# Patient Record
Sex: FEMALE | Race: WHITE | NOT HISPANIC OR LATINO | Employment: OTHER | URBAN - METROPOLITAN AREA
[De-identification: names, ages, dates, MRNs, and addresses within clinical notes are randomized per-mention and may not be internally consistent; named-entity substitution may affect disease eponyms.]

---

## 2017-09-14 ENCOUNTER — OFFICE VISIT (OUTPATIENT)
Dept: CARDIOLOGY | Facility: MEDICAL CENTER | Age: 67
End: 2017-09-14
Payer: MEDICARE

## 2017-09-14 VITALS
BODY MASS INDEX: 30.92 KG/M2 | SYSTOLIC BLOOD PRESSURE: 118 MMHG | HEIGHT: 68 IN | HEART RATE: 72 BPM | DIASTOLIC BLOOD PRESSURE: 72 MMHG | OXYGEN SATURATION: 95 % | WEIGHT: 204 LBS

## 2017-09-14 DIAGNOSIS — E78.5 DYSLIPIDEMIA: ICD-10-CM

## 2017-09-14 DIAGNOSIS — R00.2 PALPITATIONS: ICD-10-CM

## 2017-09-14 DIAGNOSIS — I10 ESSENTIAL HYPERTENSION: ICD-10-CM

## 2017-09-14 DIAGNOSIS — I34.0 MITRAL VALVE INSUFFICIENCY, UNSPECIFIED ETIOLOGY: ICD-10-CM

## 2017-09-14 PROCEDURE — 99214 OFFICE O/P EST MOD 30 MIN: CPT | Performed by: INTERNAL MEDICINE

## 2017-09-14 RX ORDER — FLUOXETINE HYDROCHLORIDE 20 MG/1
20 CAPSULE ORAL DAILY
COMMUNITY

## 2017-09-14 ASSESSMENT — ENCOUNTER SYMPTOMS
BRUISES/BLEEDS EASILY: 0
DIZZINESS: 0
DEPRESSION: 0
LOSS OF CONSCIOUSNESS: 0
ORTHOPNEA: 0
FALLS: 0
PND: 0
SHORTNESS OF BREATH: 0
PALPITATIONS: 1
ABDOMINAL PAIN: 0

## 2017-09-14 NOTE — LETTER
[facility]   [address], [address]  [place] [address]  Phone: [phone]  Fax: [phone]              Marine Wall  1950    Encounter Date: 9/14/2017    Renetta Lopez M.D.          PROGRESS NOTE:  Subjective:   Marine Wall is a 67 y.o. femaleWith past medical history significant for hypertension and mitral regurgitation who is presenting to clinic for follow-up. Patient's main concern is almost daily palpitations. She describes them as either sustained fast heartbeat or flip-flop in her chest. No associated dizziness. No history of syncope. No chest discomfort or dyspnea. She drinks 2 cups of caffeinated tea every morning.    She walks 3 times a day with her dog for about half a mile in 10-20 minutes without any symptoms.    Her blood pressure is usually 110/70's.      Past Medical History:   Diagnosis Date   • Hypertension    • Palpitation      Past Surgical History:   Procedure Laterality Date   • AIYANA BY LAPAROSCOPY      3 yr ago     Family History   Problem Relation Age of Onset   • Heart Disease Father    • Heart Attack Father      MI at age 52     History   Smoking Status   • Never Smoker   Smokeless Tobacco   • Never Used     No alcohol or recreational drug use.    No Known Allergies  Outpatient Encounter Prescriptions as of 9/14/2017   Medication Sig Dispense Refill   • fluoxetine (PROZAC) 20 MG Cap Take 20 mg by mouth every day.     • Calcium Carbonate (CALCIUM 600 PO) Take  by mouth.     • lisinopril (PRINIVIL) 20 MG TABS Take 20 mg by mouth every day.     • vitamin D, Ergocalciferol, (DRISDOL) 68479 UNITS CAPS capsule Take  by mouth every 7 days.       No facility-administered encounter medications on file as of 9/14/2017.      Review of Systems   Constitutional: Negative for malaise/fatigue.   HENT: Negative.    Respiratory: Negative for shortness of breath.    Cardiovascular: Positive for palpitations. Negative for chest pain,  "orthopnea, leg swelling and PND.   Gastrointestinal: Negative for abdominal pain.   Musculoskeletal: Negative for falls.   Skin: Negative.    Neurological: Negative for dizziness and loss of consciousness.   Endo/Heme/Allergies: Does not bruise/bleed easily.   Psychiatric/Behavioral: Negative for depression.   All other systems reviewed and are negative.       Objective:   /72   Pulse 72   Ht 1.715 m (5' 7.5\")   Wt 92.5 kg (204 lb)   SpO2 95%   BMI 31.48 kg/m²      Physical Exam   Constitutional: She is oriented to person, place, and time. No distress.   HENT:   Head: Normocephalic and atraumatic.   Eyes: Conjunctivae are normal.   Neck: Normal range of motion. Neck supple. No JVD present.   Cardiovascular: Normal rate and regular rhythm.  Exam reveals no gallop and no friction rub.    Murmur heard.   Systolic murmur is present with a grade of 2/6   Pulmonary/Chest: Effort normal and breath sounds normal. No respiratory distress. She has no wheezes. She has no rales.   Abdominal: Soft. There is no tenderness.   Musculoskeletal: She exhibits no edema.   Neurological: She is alert and oriented to person, place, and time.   Skin: Skin is warm and dry. She is not diaphoretic.   Psychiatric: She has a normal mood and affect.   Nursing note and vitals reviewed.    Echocardiogram in 2014 showed normal LV function with mild mitral regurgitation.      Assessment:     1. Essential hypertension     2. Dyslipidemia     3. Mitral valve insufficiency, unspecified etiology  ECHOCARDIOGRAM COMP W/O CONT   4. Palpitations  HOLTER MONITOR STUDY       Medical Decision Making:  Today's Assessment / Status / Plan:     Patient appears to have pretty frequent palpitations. She has a history of short SVT in the past. For now I have advised her to limit her caffeinated beverage intake to one drink per day or less. I will obtain a Holter monitor for further evaluation. Hold off on addition of AV hoa blocking agents at this " time.    Blood pressure is at goal. Continue lisinopril at current dose.    Mitral regurgitation appreciated on exam. I will refer her for an echocardiogram to evaluate for severity of the mitral regurgitation.    Return to clinic in 1 month or earlier if needed.    Thank you for allowing me to participate in the care of this patient. Please do not hesitate to contact me with any questions.    Renetta Lopez MD  Cardiologist  [facility]      PLEASE NOTE: This dictation was created using voice recognition software.         JUAN ANTONIO Narvaez  [address] [place] [id number]  VIA Facsimile: [phone]

## 2017-09-14 NOTE — PROGRESS NOTES
Subjective:   Marine Wall is a 67 y.o. femaleWith past medical history significant for hypertension and mitral regurgitation who is presenting to clinic for follow-up. Patient's main concern is almost daily palpitations. She describes them as either sustained fast heartbeat or flip-flop in her chest. No associated dizziness. No history of syncope. No chest discomfort or dyspnea. She drinks 2 cups of caffeinated tea every morning.    She walks 3 times a day with her dog for about half a mile in 10-20 minutes without any symptoms.    Her blood pressure is usually 110/70's.      Past Medical History:   Diagnosis Date   • Hypertension    • Palpitation      Past Surgical History:   Procedure Laterality Date   • AIYANA BY LAPAROSCOPY      3 yr ago     Family History   Problem Relation Age of Onset   • Heart Disease Father    • Heart Attack Father      MI at age 52     History   Smoking Status   • Never Smoker   Smokeless Tobacco   • Never Used     No alcohol or recreational drug use.    No Known Allergies  Outpatient Encounter Prescriptions as of 9/14/2017   Medication Sig Dispense Refill   • fluoxetine (PROZAC) 20 MG Cap Take 20 mg by mouth every day.     • Calcium Carbonate (CALCIUM 600 PO) Take  by mouth.     • lisinopril (PRINIVIL) 20 MG TABS Take 20 mg by mouth every day.     • vitamin D, Ergocalciferol, (DRISDOL) 58135 UNITS CAPS capsule Take  by mouth every 7 days.       No facility-administered encounter medications on file as of 9/14/2017.      Review of Systems   Constitutional: Negative for malaise/fatigue.   HENT: Negative.    Respiratory: Negative for shortness of breath.    Cardiovascular: Positive for palpitations. Negative for chest pain, orthopnea, leg swelling and PND.   Gastrointestinal: Negative for abdominal pain.   Musculoskeletal: Negative for falls.   Skin: Negative.    Neurological: Negative for dizziness and loss of consciousness.   Endo/Heme/Allergies: Does not bruise/bleed easily.  "  Psychiatric/Behavioral: Negative for depression.   All other systems reviewed and are negative.       Objective:   /72   Pulse 72   Ht 1.715 m (5' 7.5\")   Wt 92.5 kg (204 lb)   SpO2 95%   BMI 31.48 kg/m²     Physical Exam   Constitutional: She is oriented to person, place, and time. No distress.   HENT:   Head: Normocephalic and atraumatic.   Eyes: Conjunctivae are normal.   Neck: Normal range of motion. Neck supple. No JVD present.   Cardiovascular: Normal rate and regular rhythm.  Exam reveals no gallop and no friction rub.    Murmur heard.   Systolic murmur is present with a grade of 2/6   Pulmonary/Chest: Effort normal and breath sounds normal. No respiratory distress. She has no wheezes. She has no rales.   Abdominal: Soft. There is no tenderness.   Musculoskeletal: She exhibits no edema.   Neurological: She is alert and oriented to person, place, and time.   Skin: Skin is warm and dry. She is not diaphoretic.   Psychiatric: She has a normal mood and affect.   Nursing note and vitals reviewed.    Echocardiogram in 2014 showed normal LV function with mild mitral regurgitation.      Assessment:     1. Essential hypertension     2. Dyslipidemia     3. Mitral valve insufficiency, unspecified etiology  ECHOCARDIOGRAM COMP W/O CONT   4. Palpitations  HOLTER MONITOR STUDY       Medical Decision Making:  Today's Assessment / Status / Plan:     Patient appears to have pretty frequent palpitations. She has a history of short SVT in the past. For now I have advised her to limit her caffeinated beverage intake to one drink per day or less. I will obtain a Holter monitor for further evaluation. Hold off on addition of AV hoa blocking agents at this time.    Blood pressure is at goal. Continue lisinopril at current dose.    Mitral regurgitation appreciated on exam. I will refer her for an echocardiogram to evaluate for severity of the mitral regurgitation.    Return to clinic in 1 month or earlier if " needed.    Thank you for allowing me to participate in the care of this patient. Please do not hesitate to contact me with any questions.    Renetta Lopez MD  Cardiologist  [facility]      PLEASE NOTE: This dictation was created using voice recognition software.

## 2017-09-14 NOTE — PATIENT INSTRUCTIONS
Please decrease your caffeine intake to either one 12oz cup per day or none.   You can replace with decaf or herbal tea.

## 2017-09-29 ENCOUNTER — HOSPITAL ENCOUNTER (OUTPATIENT)
Dept: CARDIOLOGY | Facility: MEDICAL CENTER | Age: 67
End: 2017-09-29
Attending: INTERNAL MEDICINE
Payer: MEDICARE

## 2017-09-29 DIAGNOSIS — I34.0 MITRAL VALVE INSUFFICIENCY, UNSPECIFIED ETIOLOGY: ICD-10-CM

## 2017-09-29 PROCEDURE — 93306 TTE W/DOPPLER COMPLETE: CPT | Mod: 26 | Performed by: INTERNAL MEDICINE

## 2017-10-02 LAB
LV EJECT FRACT  99904: 65
LV EJECT FRACT MOD 2C 99903: 68.57
LV EJECT FRACT MOD 4C 99902: 62.92
LV EJECT FRACT MOD BP 99901: 65.56

## 2017-10-03 ENCOUNTER — TELEPHONE (OUTPATIENT)
Dept: CARDIOLOGY | Facility: MEDICAL CENTER | Age: 67
End: 2017-10-03

## 2017-10-03 NOTE — TELEPHONE ENCOUNTER
Attempted to call pt to discuss echo. Unable to leave message. Will call back later.    1610: S/w pt about Echo results. She denies any questions at this time and is appreciative of call. Pt will FU as planned.    ----- Message from Renetta Lopez M.D. sent at 10/3/2017  4:01 PM PDT -----  Reviewed echocardiogram. Looks good. Only mild MR.  No change in management at this time.   Thank you   AA

## 2017-10-12 ENCOUNTER — NON-PROVIDER VISIT (OUTPATIENT)
Dept: CARDIOLOGY | Facility: MEDICAL CENTER | Age: 67
End: 2017-10-12
Payer: MEDICARE

## 2017-10-12 DIAGNOSIS — I49.3 PVC (PREMATURE VENTRICULAR CONTRACTION): ICD-10-CM

## 2017-10-12 DIAGNOSIS — I49.1 PREMATURE ATRIAL CONTRACTION: ICD-10-CM

## 2017-10-12 DIAGNOSIS — R00.2 PALPITATIONS: ICD-10-CM

## 2017-10-12 DIAGNOSIS — R00.0 SINUS TACHYCARDIA: ICD-10-CM

## 2017-10-17 LAB — EKG IMPRESSION: NORMAL

## 2017-10-17 PROCEDURE — 93224 XTRNL ECG REC UP TO 48 HRS: CPT | Performed by: INTERNAL MEDICINE

## 2017-10-19 ENCOUNTER — OFFICE VISIT (OUTPATIENT)
Dept: CARDIOLOGY | Facility: MEDICAL CENTER | Age: 67
End: 2017-10-19
Payer: MEDICARE

## 2017-10-19 VITALS
OXYGEN SATURATION: 96 % | HEART RATE: 78 BPM | SYSTOLIC BLOOD PRESSURE: 118 MMHG | HEIGHT: 68 IN | DIASTOLIC BLOOD PRESSURE: 68 MMHG

## 2017-10-19 DIAGNOSIS — I47.10 SVT (SUPRAVENTRICULAR TACHYCARDIA): ICD-10-CM

## 2017-10-19 DIAGNOSIS — I10 ESSENTIAL HYPERTENSION: ICD-10-CM

## 2017-10-19 PROCEDURE — 99214 OFFICE O/P EST MOD 30 MIN: CPT | Performed by: INTERNAL MEDICINE

## 2017-10-19 RX ORDER — METOPROLOL SUCCINATE 25 MG/1
12.5 TABLET, EXTENDED RELEASE ORAL DAILY
Qty: 30 TAB | Refills: 11 | Status: SHIPPED
Start: 2017-10-19 | End: 2020-01-10

## 2017-10-19 ASSESSMENT — ENCOUNTER SYMPTOMS
LOSS OF CONSCIOUSNESS: 0
BRUISES/BLEEDS EASILY: 0
SHORTNESS OF BREATH: 0
ABDOMINAL PAIN: 0
DEPRESSION: 0
DIZZINESS: 0
FALLS: 0
ORTHOPNEA: 0
PND: 0
PALPITATIONS: 1

## 2017-10-19 NOTE — PROGRESS NOTES
Subjective:   Marine Wall is a 67 y.o. Female with past medical history significant for hypertension and mitral regurgitation who is presenting to clinic for follow-up.     Patient continues to report intermittent palpitations. Some days she'll have multiple episodes, other days should she has none. She has cut down her caffeinated beverage intake to one drink per day. Has not noted any changes since cutting down on her caffeine intake.    She continues to exercise on a regular basis. Goes on regular walks with her dog or uses a treadmill. Denies any symptoms with exercise.     Blood pressure is usually well controlled, similar to today.    Past Medical History:   Diagnosis Date   • Hypertension    • Palpitation      Past Surgical History:   Procedure Laterality Date   • AIYANA BY LAPAROSCOPY      3 yr ago     Family History   Problem Relation Age of Onset   • Heart Disease Father    • Heart Attack Father      MI at age 52     History   Smoking Status   • Never Smoker   Smokeless Tobacco   • Never Used     No alcohol or recreational drug use.    No Known Allergies  Outpatient Encounter Prescriptions as of 10/19/2017   Medication Sig Dispense Refill   • metoprolol SR (TOPROL XL) 25 MG TABLET SR 24 HR Take 0.5 Tabs by mouth every day. 30 Tab 11   • fluoxetine (PROZAC) 20 MG Cap Take 20 mg by mouth every day.     • Calcium Carbonate (CALCIUM 600 PO) Take  by mouth.     • lisinopril (PRINIVIL) 20 MG TABS Take 20 mg by mouth every day.     • [DISCONTINUED] vitamin D, Ergocalciferol, (DRISDOL) 83033 UNITS CAPS capsule Take  by mouth every 7 days.       No facility-administered encounter medications on file as of 10/19/2017.      Review of Systems   Constitutional: Negative for malaise/fatigue.   HENT: Negative.    Respiratory: Negative for shortness of breath.    Cardiovascular: Positive for palpitations. Negative for chest pain, orthopnea, leg swelling and PND.   Gastrointestinal: Negative for abdominal pain.  "  Musculoskeletal: Negative for falls.   Skin: Negative.    Neurological: Negative for dizziness and loss of consciousness.   Endo/Heme/Allergies: Does not bruise/bleed easily.   Psychiatric/Behavioral: Negative for depression.   All other systems reviewed and are negative.       Objective:   /68   Pulse 78   Ht 1.715 m (5' 7.5\")   SpO2 96%     Physical Exam   Constitutional: She is oriented to person, place, and time. No distress.   HENT:   Head: Normocephalic and atraumatic.   Eyes: Conjunctivae are normal.   Neck: Normal range of motion. Neck supple. No JVD present.   Cardiovascular: Normal rate and regular rhythm.  Exam reveals no gallop and no friction rub.    No murmur heard.  Pulmonary/Chest: Effort normal and breath sounds normal. No respiratory distress. She has no wheezes. She has no rales.   Abdominal: Soft. There is no tenderness.   Musculoskeletal: She exhibits no edema.   Neurological: She is alert and oriented to person, place, and time.   Skin: Skin is warm and dry. She is not diaphoretic.   Psychiatric: She has a normal mood and affect.   Nursing note and vitals reviewed.    Echocardiogram in 2014 showed normal LV function with mild mitral regurgitation.    Echocardiogram from September 2017 was reviewed  CONCLUSIONS  Prior echo 11/20/2014. No significant change noted compared to prior   study.   Mild concentric left ventricular hypertrophy.  Normal left ventricular systolic function. Left ventricular ejection   fraction is visually estimated to be 65%.  Normal diastolic function.  Normal inferior vena cava size and inspiratory collapse.  Aortic sclerosis without stenosis.  Estimated right ventricular systolic pressure  is 25 mmHg.    Holter monitor from October 2017 was reviewed  Interpretive Statements   *  Monitoring started at 3:05 PM and continued for 48 hours. **Only 33 hours   55 minutes 34 seconds of readable data   available.**     Cardiac rhythm is Sinus. The average heart rate " was 77 BPM.   The minimum heart rate was 55 BPM, occurring at   3:55:08 AM.        The maximum heart rate was 122 BPM, occurring at 4:37:22   PM. The longest R-R interval was 1.4 seconds   occurring at         7:20:50 AM D1.   *  Ventricular ectopic activity consisted of 120 multifocal single PVCs, six   single endiastolic ventricular beats.   *  The patient's rhythm included 50 minutes 48 seconds of sinus tachycardia   with maximum heart rate of 122 BPM.   *  Supraventricular ectopic activity consisted of one atrial couplet, 17   single PACs.   *  Diary entries - symptoms listed in diary were noted in sinus rhythm with   an isolated ventricular ectopic at symptoms   occurrence.   Heart rate during symptoms 62 to 97 BPM.   Summary:   Normal sinus rhythm   No sustained arrhythmias noted   One diary entry that correlated with a PVC. overall rare PVCs.     Assessment:     1. Essential hypertension     2. SVT (supraventricular tachycardia) (CMS-[facility])         Medical Decision Making:  Today's Assessment / Status / Plan:     Patient continues to be bothered by her palpitations. Holter monitor only showed rare PVCs but some did correlate with symptoms. I do not think she needs any AV hoa blocking agents for this, however patient is extremely bothered by her symptoms. She would like to try some medication. She reports previously being on metoprolol 25 mg twice a day which made her hypotensive. She would like to try a lower dose. I will start her on metoprolol succinate 12.5 mg once a day. Patient should continue to avoid caffeine as much as possible.    Her blood pressure is at goal. Continue lisinopril at current dose.    Recent echocardiogram only showed mild mitral regurgitation. No changes in management for now.    Return to clinic in 2 months or earlier if needed.    Thank you for allowing me to participate in the care of this patient. Please do not hesitate to contact me with any questions.    Renetta Lopez  MD  Cardiologist  [facility]      PLEASE NOTE: This dictation was created using voice recognition software.

## 2017-11-01 ENCOUNTER — APPOINTMENT (RX ONLY)
Dept: URBAN - NONMETROPOLITAN AREA CLINIC 1 | Facility: CLINIC | Age: 67
Setting detail: DERMATOLOGY
End: 2017-11-01

## 2017-11-01 DIAGNOSIS — D485 NEOPLASM OF UNCERTAIN BEHAVIOR OF SKIN: ICD-10-CM

## 2017-11-01 DIAGNOSIS — H01.13 ECZEMATOUS DERMATITIS OF EYELID: ICD-10-CM

## 2017-11-01 DIAGNOSIS — M71 OTHER BURSOPATHIES: ICD-10-CM

## 2017-11-01 PROBLEM — M71.341 OTHER BURSAL CYST, RIGHT HAND: Status: ACTIVE | Noted: 2017-11-01

## 2017-11-01 PROBLEM — H01.131 ECZEMATOUS DERMATITIS OF RIGHT UPPER EYELID: Status: ACTIVE | Noted: 2017-11-01

## 2017-11-01 PROBLEM — F41.9 ANXIETY DISORDER, UNSPECIFIED: Status: ACTIVE | Noted: 2017-11-01

## 2017-11-01 PROBLEM — J30.1 ALLERGIC RHINITIS DUE TO POLLEN: Status: ACTIVE | Noted: 2017-11-01

## 2017-11-01 PROBLEM — M12.9 ARTHROPATHY, UNSPECIFIED: Status: ACTIVE | Noted: 2017-11-01

## 2017-11-01 PROBLEM — F32.9 MAJOR DEPRESSIVE DISORDER, SINGLE EPISODE, UNSPECIFIED: Status: ACTIVE | Noted: 2017-11-01

## 2017-11-01 PROBLEM — I10 ESSENTIAL (PRIMARY) HYPERTENSION: Status: ACTIVE | Noted: 2017-11-01

## 2017-11-01 PROBLEM — D48.5 NEOPLASM OF UNCERTAIN BEHAVIOR OF SKIN: Status: ACTIVE | Noted: 2017-11-01

## 2017-11-01 PROCEDURE — ? INCISION AND DRAINAGE

## 2017-11-01 PROCEDURE — 99202 OFFICE O/P NEW SF 15 MIN: CPT | Mod: 25

## 2017-11-01 PROCEDURE — 10060 I&D ABSCESS SIMPLE/SINGLE: CPT

## 2017-11-01 PROCEDURE — ? BIOPSY BY SHAVE METHOD

## 2017-11-01 PROCEDURE — 11100: CPT

## 2017-11-01 PROCEDURE — ? PRESCRIPTION

## 2017-11-01 PROCEDURE — ? COUNSELING

## 2017-11-01 RX ORDER — HYDROCORTISONE 2.5 %
OINTMENT (GRAM) TOPICAL
Qty: 1 | Refills: 0 | Status: ERX | COMMUNITY
Start: 2017-11-01

## 2017-11-01 RX ADMIN — Medication: at 21:46

## 2017-11-01 ASSESSMENT — LOCATION DETAILED DESCRIPTION DERM
LOCATION DETAILED: RIGHT LATERAL SUPERIOR EYELID
LOCATION DETAILED: RIGHT DISTAL DORSAL INDEX FINGER
LOCATION DETAILED: RIGHT PROXIMAL PALMAR SMALL FINGER

## 2017-11-01 ASSESSMENT — LOCATION SIMPLE DESCRIPTION DERM
LOCATION SIMPLE: RIGHT SUPERIOR EYELID
LOCATION SIMPLE: RIGHT SMALL FINGER
LOCATION SIMPLE: RIGHT INDEX FINGER

## 2017-11-01 ASSESSMENT — LOCATION ZONE DERM
LOCATION ZONE: FINGER
LOCATION ZONE: EYELID

## 2017-11-01 NOTE — PROCEDURE: INCISION AND DRAINAGE
Lesion Type: Cyst
Suture Text: The incision was partially closed with
Epidermal Closure: simple interrupted
Drainage Type?: clear
Preparation Text: The area was prepped with Hibiclens and alcohol.
Curette: No
Render Postcare In Note?: Yes
Size Of Lesion In Cm (Optional But May Be Required For Some Insurances): 0.6
Drainage Amount?: moderate
Epidermal Sutures: 4-0 Ethilon
Method: 11 blade
Consent was obtained and risks were reviewed including but not limited to delayed wound healing, infection, need for multiple I and D's, and pain.
Anesthesia Volume In Cc: 0.5
Curette Text (Optional): After the contents were expressed a curette was used to partially remove the cyst wall.
Dressing: dry sterile dressing
Post-Care Instructions: I reviewed with the patient in detail post-care instructions. Patient should keep wound covered and call the office should any redness, pain, swelling or worsening occur.
Detail Level: Detailed

## 2017-11-01 NOTE — PROCEDURE: BIOPSY BY SHAVE METHOD
Hemostasis: Electrodesiccation
Curettage Text: The wound bed was treated with curettage after the biopsy was done.
Cryotherapy Text: The wound bed was treated with cryotherapy after the biopsy was performed.
Consent: Written consent was obtained and risks were reviewed including but not limited to scarring, infection, bleeding, scabbing, incomplete removal, nerve damage and allergy to anesthesia.
Destruction After The Procedure: No
Biopsy Method: Aparna burdick
Anesthesia Type: 1% lidocaine with epinephrine and a 1:10 solution of 8.4% sodium bicarbonate
Additional Anesthesia Volume In Cc (Will Not Render If 0): 0
Notification Instructions: Patient will be notified of biopsy results. However, patient instructed to call the office if not contacted within 2 weeks.
Billing Type: Third-Party Bill
Render Post-Care Instructions In Note?: yes
Lab: [id number]
Dressing: no dressing applied
Electrodesiccation Text: The wound bed was treated with electrodesiccation after the biopsy was performed.
Wound Care: Polysporin ointment
X Size Of Lesion In Cm: 0.2
Electrodesiccation And Curettage Text: The wound bed was treated with electrodesiccation and curettage after the biopsy was performed.
Detail Level: Detailed
Size Of Lesion In Cm: 0.3
Anesthesia Volume In Cc: 1
Silver Nitrate Text: The wound bed was treated with silver nitrate after the biopsy was performed.
Type Of Destruction Used: Curettage
Post-Care Instructions: I reviewed with the patient in detail post-care instructions. Patient is to keep the biopsy site dry overnight, and then apply Polysporin twice daily until healed. Patient may apply hydrogen peroxide soaks to remove any crusting.
Lab Facility: [id number]
Biopsy Type: H and E

## 2017-11-14 ENCOUNTER — TELEPHONE (OUTPATIENT)
Dept: CARDIOLOGY | Facility: MEDICAL CENTER | Age: 67
End: 2017-11-14

## 2019-09-24 ENCOUNTER — TELEPHONE (OUTPATIENT)
Dept: CARDIOLOGY | Facility: MEDICAL CENTER | Age: 69
End: 2019-09-24

## 2019-09-24 NOTE — TELEPHONE ENCOUNTER
Called patient in regards to any records or and lab results we need to obtain before upcoming appointment w/. Patient states she [facility] in [place]. Results received and sent to scanning.

## 2020-01-07 ENCOUNTER — TELEPHONE (OUTPATIENT)
Dept: CARDIOLOGY | Facility: MEDICAL CENTER | Age: 70
End: 2020-01-07

## 2020-01-07 NOTE — TELEPHONE ENCOUNTER
"Called pt back. She states for the past couple days, she's been experiencing palpitations \"now and again\". She has history of these however they are becoming more noticeable. Pt checks her BP daily, 148/77 this morning. She reports she has not been taking her metoprolol 12.5mg daily because she states \"before, it will bring my pulse as low as 40.\" Advised that metoprolol not only treats high BP but also regulates HR to prevent palpitations. Verbalized understanding. Pt states she drinks plenty of water and she has avoided caffeine products. She previously had a ZioPatch monitor which she was allergic to. Symptoms reported are fatigue/tired in the morning after she wakes up. Pt denies CP, light-headedness, and SOB, currently able to continue her day-to-day activities.    In the mean time, pt advised to continue hydration and avoiding caffeine products. Should she feel symptoms, she will report to ER right away, verbalized understanding.    To Dr. Lopez - pt has had several cancelled appointments since October 2019. She is scheduled for [facility] on 3/9/20. Please advise. Thank you!  "

## 2020-01-08 NOTE — TELEPHONE ENCOUNTER
AA      Patient is following up with another call, she said she was expecting a call back yesterday. She can be reached at [phone].

## 2020-01-10 ENCOUNTER — HOSPITAL ENCOUNTER (OUTPATIENT)
Dept: LAB | Facility: MEDICAL CENTER | Age: 70
End: 2020-01-10
Attending: INTERNAL MEDICINE
Payer: MEDICARE

## 2020-01-10 ENCOUNTER — OFFICE VISIT (OUTPATIENT)
Dept: CARDIOLOGY | Facility: MEDICAL CENTER | Age: 70
End: 2020-01-10
Payer: MEDICARE

## 2020-01-10 VITALS
HEART RATE: 82 BPM | BODY MASS INDEX: 28.49 KG/M2 | DIASTOLIC BLOOD PRESSURE: 60 MMHG | HEIGHT: 68 IN | OXYGEN SATURATION: 97 % | SYSTOLIC BLOOD PRESSURE: 122 MMHG | WEIGHT: 188 LBS

## 2020-01-10 DIAGNOSIS — I47.10 SVT (SUPRAVENTRICULAR TACHYCARDIA) (HCC): ICD-10-CM

## 2020-01-10 DIAGNOSIS — I10 ESSENTIAL HYPERTENSION: ICD-10-CM

## 2020-01-10 DIAGNOSIS — I15.8 OTHER SECONDARY HYPERTENSION: ICD-10-CM

## 2020-01-10 DIAGNOSIS — I34.0 MITRAL VALVE INSUFFICIENCY, UNSPECIFIED ETIOLOGY: ICD-10-CM

## 2020-01-10 DIAGNOSIS — Z79.899 ENCOUNTER FOR LONG-TERM (CURRENT) USE OF HIGH-RISK MEDICATION: ICD-10-CM

## 2020-01-10 DIAGNOSIS — R00.2 PALPITATIONS: ICD-10-CM

## 2020-01-10 LAB
ANION GAP SERPL CALC-SCNC: 9 MMOL/L (ref 0–11.9)
BUN SERPL-MCNC: 19 MG/DL (ref 8–22)
CALCIUM SERPL-MCNC: 9.7 MG/DL (ref 8.5–10.5)
CHLORIDE SERPL-SCNC: 103 MMOL/L (ref 96–112)
CO2 SERPL-SCNC: 27 MMOL/L (ref 20–33)
CREAT SERPL-MCNC: 0.77 MG/DL (ref 0.5–1.4)
EKG IMPRESSION: NORMAL
ERYTHROCYTE [DISTWIDTH] IN BLOOD BY AUTOMATED COUNT: 41.6 FL (ref 35.9–50)
GLUCOSE SERPL-MCNC: 84 MG/DL (ref 65–99)
HCT VFR BLD AUTO: 48.6 % (ref 37–47)
HGB BLD-MCNC: 16 G/DL (ref 12–16)
MCH RBC QN AUTO: 30 PG (ref 27–33)
MCHC RBC AUTO-ENTMCNC: 32.9 G/DL (ref 33.6–35)
MCV RBC AUTO: 91 FL (ref 81.4–97.8)
PLATELET # BLD AUTO: 279 K/UL (ref 164–446)
PMV BLD AUTO: 12.2 FL (ref 9–12.9)
POTASSIUM SERPL-SCNC: 3.9 MMOL/L (ref 3.6–5.5)
RBC # BLD AUTO: 5.34 M/UL (ref 4.2–5.4)
SODIUM SERPL-SCNC: 139 MMOL/L (ref 135–145)
T4 FREE SERPL-MCNC: 0.99 NG/DL (ref 0.53–1.43)
TSH SERPL DL<=0.005 MIU/L-ACNC: 0.96 UIU/ML (ref 0.38–5.33)
WBC # BLD AUTO: 12.2 K/UL (ref 4.8–10.8)

## 2020-01-10 PROCEDURE — 93000 ELECTROCARDIOGRAM COMPLETE: CPT | Performed by: INTERNAL MEDICINE

## 2020-01-10 PROCEDURE — 84439 ASSAY OF FREE THYROXINE: CPT | Mod: GA

## 2020-01-10 PROCEDURE — 85027 COMPLETE CBC AUTOMATED: CPT

## 2020-01-10 PROCEDURE — 84443 ASSAY THYROID STIM HORMONE: CPT | Mod: GA

## 2020-01-10 PROCEDURE — 99215 OFFICE O/P EST HI 40 MIN: CPT | Performed by: INTERNAL MEDICINE

## 2020-01-10 PROCEDURE — 36415 COLL VENOUS BLD VENIPUNCTURE: CPT | Mod: GA

## 2020-01-10 PROCEDURE — 80048 BASIC METABOLIC PNL TOTAL CA: CPT

## 2020-01-10 ASSESSMENT — ENCOUNTER SYMPTOMS
ORTHOPNEA: 0
DIZZINESS: 0
PND: 0
DIAPHORESIS: 0
DEPRESSION: 0
FALLS: 0
LOSS OF CONSCIOUSNESS: 0
ABDOMINAL PAIN: 0
PALPITATIONS: 1
SHORTNESS OF BREATH: 0

## 2020-01-10 NOTE — PROGRESS NOTES
Chief Complaint   Patient presents with   • Hypertension       Subjective:   Marine Wall is a 69 y.o. female who presents today to follow-up on palpitations.    Patient was last seen in 2017 and reports a longstanding history of palpitations.    She reports being in her usual state of health till about 10 days ago when she started noticing increasing frequency of palpitations which she describes as skipped beats and flip-flopping of her heart.  She reports having symptoms daily without any clear triggers which usually resolve within few minutes spontaneously.    She reports today that she has been on Prozac 20 mg for about 20+ years.  She recently saw her sister who suffers from tardive dyskinesia and decided to wean herself off of the Prozac over a month or so.  Shortly after being off of the Prozac she started being really depressed and decided to go back on Prozac and started taking 40 mg once daily about a week ago.  Shortly after starting the higher dose of Prozac her symptoms worsened as noted above.    Her blood pressures are mostly in the 110s to 120s systolic.  She reports having one systolic reading in the 140s which concerned her as well.    Patient has questions about her last echocardiogram.    Past Medical History:   Diagnosis Date   • Hypertension    • Palpitation      Past Surgical History:   Procedure Laterality Date   • AIYANA BY LAPAROSCOPY      3 yr ago     Family History   Problem Relation Age of Onset   • Heart Disease Father    • Heart Attack Father         MI at age 52     Social History     Socioeconomic History   • Marital status: Legally      Spouse name: Not on file   • Number of children: Not on file   • Years of education: Not on file   • Highest education level: Not on file   Occupational History   • Not on file   Social Needs   • Financial resource strain: Not on file   • Food insecurity:     Worry: Not on file     Inability: Not on file   • Transportation needs:      Medical: Not on file     Non-medical: Not on file   Tobacco Use   • Smoking status: Never Smoker   • Smokeless tobacco: Never Used   Substance and Sexual Activity   • Alcohol use: Not Currently   • Drug use: No   • Sexual activity: Not on file   Lifestyle   • Physical activity:     Days per week: Not on file     Minutes per session: Not on file   • Stress: Not on file   Relationships   • Social connections:     Talks on phone: Not on file     Gets together: Not on file     Attends [employer] service: Not on file     Active member of club or organization: Not on file     Attends meetings of clubs or organizations: Not on file     Relationship status: Not on file   • Intimate partner violence:     Fear of current or ex partner: Not on file     Emotionally abused: Not on file     Physically abused: Not on file     Forced sexual activity: Not on file   Other Topics Concern   • Not on file   Social History Narrative   • Not on file     No Known Allergies  Outpatient Encounter Medications as of 1/10/2020   Medication Sig Dispense Refill   • fluoxetine (PROZAC) 20 MG Cap Take 20 mg by mouth every day.     • Calcium Carbonate (CALCIUM 600 PO) Take  by mouth.     • lisinopril (PRINIVIL) 20 MG TABS Take 20 mg by mouth every day.     • [DISCONTINUED] metoprolol SR (TOPROL XL) 25 MG TABLET SR 24 HR Take 0.5 Tabs by mouth every day. (Patient not taking: Reported on 1/10/2020) 30 Tab 11     No facility-administered encounter medications on file as of 1/10/2020.      Review of Systems   Constitutional: Negative for diaphoresis and malaise/fatigue.   Respiratory: Negative for shortness of breath.    Cardiovascular: Positive for palpitations. Negative for chest pain, orthopnea, leg swelling and PND.   Gastrointestinal: Negative for abdominal pain.   Musculoskeletal: Negative for falls.   Neurological: Negative for dizziness and loss of consciousness.   Psychiatric/Behavioral: Negative for depression.   All other systems reviewed and  "are negative.       Objective:   /60 (BP Location: Left arm, Patient Position: Sitting, BP Cuff Size: Adult)   Pulse 82   Ht 1.727 m (5' 8\")   Wt 85.3 kg (188 lb)   SpO2 97%   BMI 28.59 kg/m²     Physical Exam   Constitutional: She is oriented to person, place, and time. She appears well-developed and well-nourished. No distress.   HENT:   Head: Normocephalic and atraumatic.   Eyes: Conjunctivae are normal. No scleral icterus.   Neck: Normal range of motion. Neck supple.   Cardiovascular: Normal rate, regular rhythm and normal heart sounds. Exam reveals no gallop and no friction rub.   No murmur heard.  Pulmonary/Chest: Effort normal and breath sounds normal. No respiratory distress. She has no wheezes. She has no rales.   Abdominal: Soft. She exhibits no distension. There is no tenderness.   Musculoskeletal:         General: No edema.   Neurological: She is alert and oriented to person, place, and time.   Skin: Skin is warm and dry. She is not diaphoretic.   Psychiatric: She has a normal mood and affect. Her behavior is normal.   Nursing note and vitals reviewed.    Labs performed in August 2019 were reviewed and showed normal TSH.  Normal creatinine.  Normal hemoglobin  , HDL 59    EKG performed today was personally reviewed and per my interpretation shows sinus rhythm with occasional PACs.  ST depressions in the anterolateral leads.    Assessment:     1. Palpitations  EKG    CBC WITHOUT DIFFERENTIAL    Basic Metabolic Panel    TSH    FREE THYROXINE    Holter Monitor / Event Recorder   2. Essential hypertension     3. Mitral valve insufficiency, unspecified etiology     4. Encounter for long-term (current) use of high-risk medication         Medical Decision Making:  Today's Assessment / Status / Plan:     Palpitations: Based on her history appear to be related to frequent ectopy.  She will be referred for a 2-day Bio-Tel monitor with 3 channels to evaluate for burden of ectopy versus other " arrhythmias.  No new medications for now.  Basic labs have been ordered today.  Patient has been strongly advised to discuss with her PCP regarding her Prozac dosing as I suspect the changes in that medication are the likely cause of her worsening symptoms.  She may consider reducing her Prozac dosing to 20 mg which was her chronic dose before she discontinued it.    Hypertension: Blood pressure is at goal today.  No medication changes for now.  Continue lisinopril at current dose.    Mitral regurgitation: Mild in severity noted on echocardiogram in 2017.  Patient wonders if she needs a repeat echocardiogram.  She has been reassured that she did not have any severe valvular pathology on her last echocardiogram and does not have any significant murmurs.  No indication for an echocardiogram at this time.  We can reevaluate this based on her Holter results.    Return to clinic in 1 month or earlier if needed.    Thank you for allowing me to participate in the care of this patient. Please do not hesitate to contact me with any questions.    Renetta Lopez MD, [facility]  Cardiologist  [facility]    PLEASE NOTE: This dictation was created using voice recognition software.

## 2020-01-10 NOTE — LETTER
[facility]   [address]   [address]  [place] [address]  Phone: [phone]  Fax: [phone]              Marine Wall  1950    Encounter Date: 1/10/2020    Renetta Lopez M.D.          PROGRESS NOTE:  Chief Complaint   Patient presents with   • Hypertension       Subjective:   Marine Wall is a 69 y.o. female who presents today to follow-up on palpitations.    Patient was last seen in 2017 and reports a longstanding history of palpitations.    She reports being in her usual state of health till about 10 days ago when she started noticing increasing frequency of palpitations which she describes as skipped beats and flip-flopping of her heart.  She reports having symptoms daily without any clear triggers which usually resolve within few minutes spontaneously.    She reports today that she has been on Prozac 20 mg for about 20+ years.  She recently saw her sister who suffers from tardive dyskinesia and decided to wean herself off of the Prozac over a month or so.  Shortly after being off of the Prozac she started being really depressed and decided to go back on Prozac and started taking 40 mg once daily about a week ago.  Shortly after starting the higher dose of Prozac her symptoms worsened as noted above.    Her blood pressures are mostly in the 110s to 120s systolic.  She reports having one systolic reading in the 140s which concerned her as well.    Patient has questions about her last echocardiogram.    Past Medical History:   Diagnosis Date   • Hypertension    • Palpitation      Past Surgical History:   Procedure Laterality Date   • AIYANA BY LAPAROSCOPY      3 yr ago     Family History   Problem Relation Age of Onset   • Heart Disease Father    • Heart Attack Father         MI at age 52     Social History     Socioeconomic History   • Marital status: Legally      Spouse name: Not on file   • Number of children: Not on file      • Years of education: Not on file   • Highest education level: Not on file   Occupational History   • Not on file   Social Needs   • Financial resource strain: Not on file   • Food insecurity:     Worry: Not on file     Inability: Not on file   • Transportation needs:     Medical: Not on file     Non-medical: Not on file   Tobacco Use   • Smoking status: Never Smoker   • Smokeless tobacco: Never Used   Substance and Sexual Activity   • Alcohol use: Not Currently   • Drug use: No   • Sexual activity: Not on file   Lifestyle   • Physical activity:     Days per week: Not on file     Minutes per session: Not on file   • Stress: Not on file   Relationships   • Social connections:     Talks on phone: Not on file     Gets together: Not on file     Attends [employer] service: Not on file     Active member of club or organization: Not on file     Attends meetings of clubs or organizations: Not on file     Relationship status: Not on file   • Intimate partner violence:     Fear of current or ex partner: Not on file     Emotionally abused: Not on file     Physically abused: Not on file     Forced sexual activity: Not on file   Other Topics Concern   • Not on file   Social History Narrative   • Not on file     No Known Allergies  Outpatient Encounter Medications as of 1/10/2020   Medication Sig Dispense Refill   • fluoxetine (PROZAC) 20 MG Cap Take 20 mg by mouth every day.     • Calcium Carbonate (CALCIUM 600 PO) Take  by mouth.     • lisinopril (PRINIVIL) 20 MG TABS Take 20 mg by mouth every day.     • [DISCONTINUED] metoprolol SR (TOPROL XL) 25 MG TABLET SR 24 HR Take 0.5 Tabs by mouth every day. (Patient not taking: Reported on 1/10/2020) 30 Tab 11     No facility-administered encounter medications on file as of 1/10/2020.      Review of Systems   Constitutional: Negative for diaphoresis and malaise/fatigue.   Respiratory: Negative for shortness of breath.    Cardiovascular: Positive for palpitations. Negative for chest  "pain, orthopnea, leg swelling and PND.   Gastrointestinal: Negative for abdominal pain.   Musculoskeletal: Negative for falls.   Neurological: Negative for dizziness and loss of consciousness.   Psychiatric/Behavioral: Negative for depression.   All other systems reviewed and are negative.       Objective:   /60 (BP Location: Left arm, Patient Position: Sitting, BP Cuff Size: Adult)   Pulse 82   Ht 1.727 m (5' 8\")   Wt 85.3 kg (188 lb)   SpO2 97%   BMI 28.59 kg/m²      Physical Exam   Constitutional: She is oriented to person, place, and time. She appears well-developed and well-nourished. No distress.   HENT:   Head: Normocephalic and atraumatic.   Eyes: Conjunctivae are normal. No scleral icterus.   Neck: Normal range of motion. Neck supple.   Cardiovascular: Normal rate, regular rhythm and normal heart sounds. Exam reveals no gallop and no friction rub.   No murmur heard.  Pulmonary/Chest: Effort normal and breath sounds normal. No respiratory distress. She has no wheezes. She has no rales.   Abdominal: Soft. She exhibits no distension. There is no tenderness.   Musculoskeletal:         General: No edema.   Neurological: She is alert and oriented to person, place, and time.   Skin: Skin is warm and dry. She is not diaphoretic.   Psychiatric: She has a normal mood and affect. Her behavior is normal.   Nursing note and vitals reviewed.    Labs performed in August 2019 were reviewed and showed normal TSH.  Normal creatinine.  Normal hemoglobin  , HDL 59    EKG performed today was personally reviewed and per my interpretation shows sinus rhythm with occasional PACs.  ST depressions in the anterolateral leads.    Assessment:     1. Palpitations  EKG    CBC WITHOUT DIFFERENTIAL    Basic Metabolic Panel    TSH    FREE THYROXINE    Holter Monitor / Event Recorder   2. Essential hypertension     3. Mitral valve insufficiency, unspecified etiology     4. Encounter for long-term (current) use of high-risk " medication         Medical Decision Making:  Today's Assessment / Status / Plan:     Palpitations: Based on her history appear to be related to frequent ectopy.  She will be referred for a 2-day Bio-Tel monitor with 3 channels to evaluate for burden of ectopy versus other arrhythmias.  No new medications for now.  Basic labs have been ordered today.  Patient has been strongly advised to discuss with her PCP regarding her Prozac dosing as I suspect the changes in that medication are the likely cause of her worsening symptoms.  She may consider reducing her Prozac dosing to 20 mg which was her chronic dose before she discontinued it.    Hypertension: Blood pressure is at goal today.  No medication changes for now.  Continue lisinopril at current dose.    Mitral regurgitation: Mild in severity noted on echocardiogram in 2017.  Patient wonders if she needs a repeat echocardiogram.  She has been reassured that she did not have any severe valvular pathology on her last echocardiogram and does not have any significant murmurs.  No indication for an echocardiogram at this time.  We can reevaluate this based on her Holter results.    Return to clinic in 1 month or earlier if needed.    Thank you for allowing me to participate in the care of this patient. Please do not hesitate to contact me with any questions.    Renetta Lopez MD, [facility]  Cardiologist  [facility]    PLEASE NOTE: This dictation was created using voice recognition software.         Abbey Koehler, A.PJENISE.  [address] [place] [id number]  VIA Facsimile: [phone]

## 2020-01-14 ENCOUNTER — TELEPHONE (OUTPATIENT)
Dept: CARDIOLOGY | Facility: MEDICAL CENTER | Age: 70
End: 2020-01-14

## 2020-01-14 NOTE — TELEPHONE ENCOUNTER
"Called pt back. Pt had concerns about her elevated WBC and low Hgb. Pt reassured Hgb 16.0 which is within range. WBC 12.2 however pt does not report s/s of infection. Pt has c/o fatigue and palpitations which she states has remained \"about the same\" since her last OV. Advised if pt experiencing worsening symptoms, she should give us a call. Confirmed her one month FV on 2/11/20. She has not scheduled her BioTel monitor. Given phone number to schedulers per request. Call transferred to schedulers as well. Pt appreciative of call back.  "

## 2020-01-14 NOTE — TELEPHONE ENCOUNTER
MARIELA/juana    Pt calling to discuss recent lab results, has concerns about anemia. Please call Marine [phone]

## 2020-01-16 NOTE — TELEPHONE ENCOUNTER
Pt phone call transferred from operators. Pt was lying down resting and suddenly felt palpitations and dizziness that lasted 5-7 seconds. Pt feels fine currently however this episode scared her. She is scheduled for Holter monitor on 1/30 followed by FV with AA on 2/11/20. Pt took her BP this morning and it was normal, she didn't check her BP and HR during the episode.    Advised pt should this occur again and/or if it becomes more frequent, she should report to ER immediately. Pt verbalized understanding.    To Dr. Lopez - pt is scheduled for 2-day BioTel, 3 channels on 1/30/20. Please advise if other recommendations at this time. Thank you!

## 2020-01-17 NOTE — TELEPHONE ENCOUNTER
"Called pt back. She saw her PCP today and went to her pharmacy. There was a pending refill for metoprolol which pt is not currently prescribed. Medication was discontinued on 1/10/20 pt reported not taking. Advised it has been discontinued, verbalized understanding.    Pt has not had any palpitations since yesterday afternoon and asymptomatic. \"My heart feels calm today.\" Appreciative of call.  "

## 2020-02-18 ENCOUNTER — NON-PROVIDER VISIT (OUTPATIENT)
Dept: CARDIOLOGY | Facility: MEDICAL CENTER | Age: 70
End: 2020-02-18
Payer: MEDICARE

## 2020-02-18 DIAGNOSIS — I49.1 PAC (PREMATURE ATRIAL CONTRACTION): ICD-10-CM

## 2020-02-18 DIAGNOSIS — R00.2 PALPITATIONS: ICD-10-CM

## 2020-02-18 DIAGNOSIS — I47.10 SVT (SUPRAVENTRICULAR TACHYCARDIA) (HCC): ICD-10-CM

## 2020-02-18 PROCEDURE — 93224 XTRNL ECG REC UP TO 48 HRS: CPT | Performed by: INTERNAL MEDICINE

## 2020-02-25 LAB — EKG IMPRESSION: NORMAL

## 2024-07-08 ENCOUNTER — DOCUMENTATION (OUTPATIENT)
Dept: HOSPITALIST | Facility: MEDICAL CENTER | Age: 74
End: 2024-07-08
Payer: MEDICARE

## 2024-07-08 ENCOUNTER — HOSPITAL ENCOUNTER (OUTPATIENT)
Facility: MEDICAL CENTER | Age: 74
End: 2024-07-09
Attending: STUDENT IN AN ORGANIZED HEALTH CARE EDUCATION/TRAINING PROGRAM | Admitting: STUDENT IN AN ORGANIZED HEALTH CARE EDUCATION/TRAINING PROGRAM
Payer: MEDICARE

## 2024-07-08 DIAGNOSIS — I48.91 NEW ONSET ATRIAL FIBRILLATION (HCC): ICD-10-CM

## 2024-07-08 DIAGNOSIS — I10 ESSENTIAL HYPERTENSION: ICD-10-CM

## 2024-07-08 PROCEDURE — 99222 1ST HOSP IP/OBS MODERATE 55: CPT | Mod: GC | Performed by: STUDENT IN AN ORGANIZED HEALTH CARE EDUCATION/TRAINING PROGRAM

## 2024-07-08 PROCEDURE — G0378 HOSPITAL OBSERVATION PER HR: HCPCS

## 2024-07-09 ENCOUNTER — APPOINTMENT (OUTPATIENT)
Dept: CARDIOLOGY | Facility: MEDICAL CENTER | Age: 74
End: 2024-07-09
Attending: HOSPITALIST
Payer: MEDICARE

## 2024-07-09 VITALS
SYSTOLIC BLOOD PRESSURE: 136 MMHG | WEIGHT: 186.29 LBS | HEART RATE: 81 BPM | TEMPERATURE: 97.7 F | HEIGHT: 68 IN | DIASTOLIC BLOOD PRESSURE: 62 MMHG | BODY MASS INDEX: 28.23 KG/M2 | RESPIRATION RATE: 15 BRPM | OXYGEN SATURATION: 94 %

## 2024-07-09 PROBLEM — R07.9 CHEST PAIN: Status: RESOLVED | Noted: 2024-07-09 | Resolved: 2024-07-09

## 2024-07-09 PROBLEM — R07.9 CHEST PAIN: Status: ACTIVE | Noted: 2024-07-09

## 2024-07-09 LAB
ALBUMIN SERPL BCP-MCNC: 3.4 G/DL (ref 3.2–4.9)
ALBUMIN/GLOB SERPL: 1.5 G/DL
ALP SERPL-CCNC: 57 U/L (ref 30–99)
ALT SERPL-CCNC: 15 U/L (ref 2–50)
ANION GAP SERPL CALC-SCNC: 10 MMOL/L (ref 7–16)
AST SERPL-CCNC: 13 U/L (ref 12–45)
BASOPHILS # BLD AUTO: 0.5 % (ref 0–1.8)
BASOPHILS # BLD: 0.05 K/UL (ref 0–0.12)
BILIRUB SERPL-MCNC: 0.3 MG/DL (ref 0.1–1.5)
BUN SERPL-MCNC: 10 MG/DL (ref 8–22)
CALCIUM ALBUM COR SERPL-MCNC: 9.4 MG/DL (ref 8.5–10.5)
CALCIUM SERPL-MCNC: 8.9 MG/DL (ref 8.5–10.5)
CHLORIDE SERPL-SCNC: 110 MMOL/L (ref 96–112)
CO2 SERPL-SCNC: 23 MMOL/L (ref 20–33)
CREAT SERPL-MCNC: 0.77 MG/DL (ref 0.5–1.4)
EOSINOPHIL # BLD AUTO: 0.08 K/UL (ref 0–0.51)
EOSINOPHIL NFR BLD: 0.9 % (ref 0–6.9)
ERYTHROCYTE [DISTWIDTH] IN BLOOD BY AUTOMATED COUNT: 41.7 FL (ref 35.9–50)
GFR SERPLBLD CREATININE-BSD FMLA CKD-EPI: 80 ML/MIN/1.73 M 2
GLOBULIN SER CALC-MCNC: 2.3 G/DL (ref 1.9–3.5)
GLUCOSE SERPL-MCNC: 109 MG/DL (ref 65–99)
HCT VFR BLD AUTO: 43.7 % (ref 37–47)
HGB BLD-MCNC: 14.1 G/DL (ref 12–16)
IMM GRANULOCYTES # BLD AUTO: 0.03 K/UL (ref 0–0.11)
IMM GRANULOCYTES NFR BLD AUTO: 0.3 % (ref 0–0.9)
LV EJECT FRACT  99904: 65
LV EJECT FRACT MOD 2C 99903: 71.16
LV EJECT FRACT MOD 4C 99902: 68.46
LV EJECT FRACT MOD BP 99901: 69.27
LYMPHOCYTES # BLD AUTO: 2.05 K/UL (ref 1–4.8)
LYMPHOCYTES NFR BLD: 22.2 % (ref 22–41)
MAGNESIUM SERPL-MCNC: 2 MG/DL (ref 1.5–2.5)
MCH RBC QN AUTO: 29.1 PG (ref 27–33)
MCHC RBC AUTO-ENTMCNC: 32.3 G/DL (ref 32.2–35.5)
MCV RBC AUTO: 90.3 FL (ref 81.4–97.8)
MONOCYTES # BLD AUTO: 0.84 K/UL (ref 0–0.85)
MONOCYTES NFR BLD AUTO: 9.1 % (ref 0–13.4)
NEUTROPHILS # BLD AUTO: 6.18 K/UL (ref 1.82–7.42)
NEUTROPHILS NFR BLD: 67 % (ref 44–72)
NRBC # BLD AUTO: 0 K/UL
NRBC BLD-RTO: 0 /100 WBC (ref 0–0.2)
PLATELET # BLD AUTO: 255 K/UL (ref 164–446)
PMV BLD AUTO: 11.9 FL (ref 9–12.9)
POTASSIUM SERPL-SCNC: 3.5 MMOL/L (ref 3.6–5.5)
PROT SERPL-MCNC: 5.7 G/DL (ref 6–8.2)
RBC # BLD AUTO: 4.84 M/UL (ref 4.2–5.4)
SODIUM SERPL-SCNC: 143 MMOL/L (ref 135–145)
TSH SERPL DL<=0.005 MIU/L-ACNC: 1.11 UIU/ML (ref 0.38–5.33)
WBC # BLD AUTO: 9.2 K/UL (ref 4.8–10.8)

## 2024-07-09 PROCEDURE — 85025 COMPLETE CBC W/AUTO DIFF WBC: CPT

## 2024-07-09 PROCEDURE — 83735 ASSAY OF MAGNESIUM: CPT

## 2024-07-09 PROCEDURE — 84443 ASSAY THYROID STIM HORMONE: CPT

## 2024-07-09 PROCEDURE — 99239 HOSP IP/OBS DSCHRG MGMT >30: CPT | Performed by: HOSPITALIST

## 2024-07-09 PROCEDURE — 80053 COMPREHEN METABOLIC PANEL: CPT

## 2024-07-09 PROCEDURE — 93306 TTE W/DOPPLER COMPLETE: CPT | Mod: 26 | Performed by: INTERNAL MEDICINE

## 2024-07-09 PROCEDURE — 700102 HCHG RX REV CODE 250 W/ 637 OVERRIDE(OP)

## 2024-07-09 PROCEDURE — 93306 TTE W/DOPPLER COMPLETE: CPT

## 2024-07-09 PROCEDURE — A9270 NON-COVERED ITEM OR SERVICE: HCPCS

## 2024-07-09 PROCEDURE — G0378 HOSPITAL OBSERVATION PER HR: HCPCS

## 2024-07-09 RX ORDER — METOPROLOL SUCCINATE 25 MG/1
25 TABLET, EXTENDED RELEASE ORAL DAILY
Qty: 30 TABLET | Refills: 3 | Status: SHIPPED | OUTPATIENT
Start: 2024-07-09

## 2024-07-09 RX ORDER — ENOXAPARIN SODIUM 100 MG/ML
40 INJECTION SUBCUTANEOUS DAILY
Status: DISCONTINUED | OUTPATIENT
Start: 2024-07-10 | End: 2024-07-09

## 2024-07-09 RX ORDER — AMLODIPINE BESYLATE 5 MG/1
5 TABLET ORAL DAILY
Qty: 30 TABLET | Refills: 3 | Status: SHIPPED | OUTPATIENT
Start: 2024-07-10

## 2024-07-09 RX ORDER — FLUOXETINE HYDROCHLORIDE 20 MG/1
40 CAPSULE ORAL DAILY
Status: DISCONTINUED | OUTPATIENT
Start: 2024-07-09 | End: 2024-07-09

## 2024-07-09 RX ORDER — FLUOXETINE HYDROCHLORIDE 20 MG/1
20 CAPSULE ORAL DAILY
Status: DISCONTINUED | OUTPATIENT
Start: 2024-07-09 | End: 2024-07-09 | Stop reason: HOSPADM

## 2024-07-09 RX ORDER — AMLODIPINE BESYLATE 5 MG/1
5 TABLET ORAL
Status: DISCONTINUED | OUTPATIENT
Start: 2024-07-09 | End: 2024-07-09 | Stop reason: HOSPADM

## 2024-07-09 RX ADMIN — METOPROLOL TARTRATE 25 MG: 25 TABLET, FILM COATED ORAL at 05:14

## 2024-07-09 RX ADMIN — APIXABAN 5 MG: 5 TABLET, FILM COATED ORAL at 17:10

## 2024-07-09 RX ADMIN — APIXABAN 5 MG: 5 TABLET, FILM COATED ORAL at 05:14

## 2024-07-09 RX ADMIN — AMLODIPINE BESYLATE 5 MG: 5 TABLET ORAL at 05:14

## 2024-07-09 RX ADMIN — METOPROLOL TARTRATE 25 MG: 25 TABLET, FILM COATED ORAL at 17:10

## 2024-07-09 SDOH — ECONOMIC STABILITY: TRANSPORTATION INSECURITY
IN THE PAST 12 MONTHS, HAS LACK OF RELIABLE TRANSPORTATION KEPT YOU FROM MEDICAL APPOINTMENTS, MEETINGS, WORK OR FROM GETTING THINGS NEEDED FOR DAILY LIVING?: NO

## 2024-07-09 SDOH — ECONOMIC STABILITY: TRANSPORTATION INSECURITY
IN THE PAST 12 MONTHS, HAS THE LACK OF TRANSPORTATION KEPT YOU FROM MEDICAL APPOINTMENTS OR FROM GETTING MEDICATIONS?: NO

## 2024-07-09 ASSESSMENT — PATIENT HEALTH QUESTIONNAIRE - PHQ9
SUM OF ALL RESPONSES TO PHQ9 QUESTIONS 1 AND 2: 0
2. FEELING DOWN, DEPRESSED, IRRITABLE, OR HOPELESS: NOT AT ALL
1. LITTLE INTEREST OR PLEASURE IN DOING THINGS: NOT AT ALL

## 2024-07-09 ASSESSMENT — ENCOUNTER SYMPTOMS
RESPIRATORY NEGATIVE: 1
GASTROINTESTINAL NEGATIVE: 1
CONSTITUTIONAL NEGATIVE: 1
NEUROLOGICAL NEGATIVE: 1
MUSCULOSKELETAL NEGATIVE: 1
PSYCHIATRIC NEGATIVE: 1
EYES NEGATIVE: 1

## 2024-07-09 ASSESSMENT — SOCIAL DETERMINANTS OF HEALTH (SDOH)
IN THE PAST 12 MONTHS, HAS THE ELECTRIC, GAS, OIL, OR WATER COMPANY THREATENED TO SHUT OFF SERVICE IN YOUR HOME?: NO
WITHIN THE LAST YEAR, HAVE TO BEEN RAPED OR FORCED TO HAVE ANY KIND OF SEXUAL ACTIVITY BY YOUR PARTNER OR EX-PARTNER?: NO
WITHIN THE LAST YEAR, HAVE YOU BEEN KICKED, HIT, SLAPPED, OR OTHERWISE PHYSICALLY HURT BY YOUR PARTNER OR EX-PARTNER?: NO
WITHIN THE PAST 12 MONTHS, YOU WORRIED THAT YOUR FOOD WOULD RUN OUT BEFORE YOU GOT THE MONEY TO BUY MORE: NEVER TRUE
WITHIN THE PAST 12 MONTHS, THE FOOD YOU BOUGHT JUST DIDN'T LAST AND YOU DIDN'T HAVE MONEY TO GET MORE: NEVER TRUE
WITHIN THE LAST YEAR, HAVE YOU BEEN HUMILIATED OR EMOTIONALLY ABUSED IN OTHER WAYS BY YOUR PARTNER OR EX-PARTNER?: NO
WITHIN THE LAST YEAR, HAVE YOU BEEN AFRAID OF YOUR PARTNER OR EX-PARTNER?: NO

## 2024-07-09 ASSESSMENT — LIFESTYLE VARIABLES
HAVE PEOPLE ANNOYED YOU BY CRITICIZING YOUR DRINKING: NO
AVERAGE NUMBER OF DAYS PER WEEK YOU HAVE A DRINK CONTAINING ALCOHOL: 0
HAVE YOU EVER FELT YOU SHOULD CUT DOWN ON YOUR DRINKING: NO
ON A TYPICAL DAY WHEN YOU DRINK ALCOHOL HOW MANY DRINKS DO YOU HAVE: 0
ALCOHOL_USE: NO
EVER FELT BAD OR GUILTY ABOUT YOUR DRINKING: NO
TOTAL SCORE: 0
CONSUMPTION TOTAL: NEGATIVE
HOW MANY TIMES IN THE PAST YEAR HAVE YOU HAD 5 OR MORE DRINKS IN A DAY: 0
EVER HAD A DRINK FIRST THING IN THE MORNING TO STEADY YOUR NERVES TO GET RID OF A HANGOVER: NO
DOES PATIENT WANT TO STOP DRINKING: NO

## 2024-07-25 ENCOUNTER — TELEPHONE (OUTPATIENT)
Dept: HEALTH INFORMATION MANAGEMENT | Facility: OTHER | Age: 74
End: 2024-07-25
Payer: MEDICARE